# Patient Record
Sex: FEMALE | Race: ASIAN | NOT HISPANIC OR LATINO
[De-identification: names, ages, dates, MRNs, and addresses within clinical notes are randomized per-mention and may not be internally consistent; named-entity substitution may affect disease eponyms.]

---

## 2022-01-08 ENCOUNTER — APPOINTMENT (OUTPATIENT)
Dept: ANTEPARTUM | Facility: CLINIC | Age: 47
End: 2022-01-08
Payer: COMMERCIAL

## 2022-01-08 ENCOUNTER — APPOINTMENT (OUTPATIENT)
Dept: OBGYN | Facility: CLINIC | Age: 47
End: 2022-01-08
Payer: COMMERCIAL

## 2022-01-08 VITALS
HEIGHT: 63 IN | BODY MASS INDEX: 21.79 KG/M2 | WEIGHT: 123 LBS | SYSTOLIC BLOOD PRESSURE: 124 MMHG | DIASTOLIC BLOOD PRESSURE: 75 MMHG

## 2022-01-08 DIAGNOSIS — Z82.49 FAMILY HISTORY OF ISCHEMIC HEART DISEASE AND OTHER DISEASES OF THE CIRCULATORY SYSTEM: ICD-10-CM

## 2022-01-08 DIAGNOSIS — Z83.3 FAMILY HISTORY OF DIABETES MELLITUS: ICD-10-CM

## 2022-01-08 PROCEDURE — 76813 OB US NUCHAL MEAS 1 GEST: CPT

## 2022-01-08 PROCEDURE — 36415 COLL VENOUS BLD VENIPUNCTURE: CPT

## 2022-01-08 PROCEDURE — 0500F INITIAL PRENATAL CARE VISIT: CPT

## 2022-01-08 PROCEDURE — 76801 OB US < 14 WKS SINGLE FETUS: CPT

## 2022-01-08 RX ORDER — PNV/FERROUS SULFATE/FOLIC ACID 27-<0.5MG
TABLET ORAL
Refills: 0 | Status: ACTIVE | COMMUNITY

## 2022-01-10 ENCOUNTER — TRANSCRIPTION ENCOUNTER (OUTPATIENT)
Age: 47
End: 2022-01-10

## 2022-01-12 NOTE — PLAN
[FreeTextEntry1] : 46 year old P2 at 12w4d by LMP presenting with amenorrhea.\par -PAtient presents today as a transfer of care for high risk due to AMA\par -advised patient to start taking low dose aspirin 81mg daily\par -f/u antiphospholipid panel\par -f/u NT\par -prenatal labs from prevous office reviewed\par -f/u 4 weeks \par

## 2022-01-12 NOTE — PHYSICAL EXAM
[Chaperone Present] : A chaperone was present in the examining room during all aspects of the physical examination [Appropriately responsive] : appropriately responsive [Alert] : alert [No Acute Distress] : no acute distress [Soft] : soft [Non-tender] : non-tender [Non-distended] : non-distended [No HSM] : No HSM [No Lesions] : no lesions [No Mass] : no mass [Oriented x3] : oriented x3

## 2022-01-12 NOTE — HISTORY OF PRESENT ILLNESS
[Patient reported mammogram was normal] : Patient reported mammogram was normal [Y] : Positive pregnancy history [Mammogramdate] : 2020 [PapSmeardate] : 2021 [LMPDate] : 10/12/21 [PGxTotal] : 7 [[facility] : [facility] [[facility] : 2 [FreeTextEntry1] : TOP x1, MABx3, C/s x2

## 2022-01-15 ENCOUNTER — LABORATORY RESULT (OUTPATIENT)
Age: 47
End: 2022-01-15

## 2022-01-19 ENCOUNTER — NON-APPOINTMENT (OUTPATIENT)
Age: 47
End: 2022-01-19

## 2022-01-19 LAB
1ST TRIMESTER DATA: NORMAL
ADDENDUM DOC: NORMAL
AFP PNL SERPL: NORMAL
AFP SERPL-ACNC: NORMAL
CLINICAL BIOCHEMIST REVIEW: ABNORMAL
FREE BETA HCG 1ST TRIMESTER: NORMAL
Lab: NORMAL
NASAL BONE: PRESENT
NOTES NTD: NORMAL
NT: NORMAL
PAPP-A SERPL-ACNC: NORMAL
TRISOMY 18/3: NORMAL

## 2022-02-02 ENCOUNTER — APPOINTMENT (OUTPATIENT)
Dept: OBGYN | Facility: CLINIC | Age: 47
End: 2022-02-02
Payer: COMMERCIAL

## 2022-02-02 VITALS
SYSTOLIC BLOOD PRESSURE: 136 MMHG | BODY MASS INDEX: 22.86 KG/M2 | HEIGHT: 63 IN | DIASTOLIC BLOOD PRESSURE: 77 MMHG | WEIGHT: 129 LBS

## 2022-02-02 VITALS — SYSTOLIC BLOOD PRESSURE: 126 MMHG | DIASTOLIC BLOOD PRESSURE: 82 MMHG

## 2022-02-02 DIAGNOSIS — O09.529 SUPERVISION OF ELDERLY MULTIGRAVIDA, UNSPECIFIED TRIMESTER: ICD-10-CM

## 2022-02-02 DIAGNOSIS — Z87.42 PERSONAL HISTORY OF OTHER DISEASES OF THE FEMALE GENITAL TRACT: ICD-10-CM

## 2022-02-02 DIAGNOSIS — O28.9 UNSPECIFIED ABNORMAL FINDINGS ON ANTENATAL SCREENING OF MOTHER: ICD-10-CM

## 2022-02-02 DIAGNOSIS — Z3A.12 12 WEEKS GESTATION OF PREGNANCY: ICD-10-CM

## 2022-02-02 DIAGNOSIS — Z3A.16 16 WEEKS GESTATION OF PREGNANCY: ICD-10-CM

## 2022-02-02 PROCEDURE — 99213 OFFICE O/P EST LOW 20 MIN: CPT

## 2022-02-09 LAB
2ND TRIMESTER DATA: NORMAL
AFP PNL SERPL: NORMAL
AFP SERPL-ACNC: NORMAL
CLINICAL BIOCHEMIST REVIEW: NORMAL
NOTES NTD: NORMAL

## 2022-03-02 ENCOUNTER — APPOINTMENT (OUTPATIENT)
Dept: OBGYN | Facility: CLINIC | Age: 47
End: 2022-03-02
Payer: COMMERCIAL

## 2022-03-02 ENCOUNTER — APPOINTMENT (OUTPATIENT)
Dept: ANTEPARTUM | Facility: CLINIC | Age: 47
End: 2022-03-02

## 2022-03-02 VITALS
BODY MASS INDEX: 23.74 KG/M2 | DIASTOLIC BLOOD PRESSURE: 83 MMHG | HEIGHT: 63 IN | SYSTOLIC BLOOD PRESSURE: 146 MMHG | WEIGHT: 134 LBS

## 2022-03-02 PROCEDURE — 0502F SUBSEQUENT PRENATAL CARE: CPT

## 2022-03-17 RX ORDER — DOCUSATE SODIUM 100 MG/1
100 CAPSULE ORAL
Qty: 90 | Refills: 0 | Status: ACTIVE | COMMUNITY
Start: 2022-03-17 | End: 1900-01-01

## 2022-03-30 ENCOUNTER — APPOINTMENT (OUTPATIENT)
Dept: OBGYN | Facility: CLINIC | Age: 47
End: 2022-03-30
Payer: COMMERCIAL

## 2022-03-30 VITALS
WEIGHT: 139 LBS | SYSTOLIC BLOOD PRESSURE: 113 MMHG | DIASTOLIC BLOOD PRESSURE: 68 MMHG | BODY MASS INDEX: 24.63 KG/M2 | HEIGHT: 63 IN

## 2022-03-30 DIAGNOSIS — Z3A.24 24 WEEKS GESTATION OF PREGNANCY: ICD-10-CM

## 2022-03-30 PROCEDURE — 0502F SUBSEQUENT PRENATAL CARE: CPT

## 2022-03-30 PROCEDURE — 36415 COLL VENOUS BLD VENIPUNCTURE: CPT

## 2022-03-31 DIAGNOSIS — Z00.00 ENCOUNTER FOR GENERAL ADULT MEDICAL EXAMINATION W/OUT ABNORMAL FINDINGS: ICD-10-CM

## 2022-03-31 LAB
BASOPHILS # BLD AUTO: 0.01 K/UL
BASOPHILS NFR BLD AUTO: 0.2 %
EOSINOPHIL # BLD AUTO: 0.02 K/UL
EOSINOPHIL NFR BLD AUTO: 0.4 %
HCT VFR BLD CALC: 34.9 %
HGB BLD-MCNC: 11.2 G/DL
IMM GRANULOCYTES NFR BLD AUTO: 2.4 %
LYMPHOCYTES # BLD AUTO: 0.81 K/UL
LYMPHOCYTES NFR BLD AUTO: 15.1 %
MAN DIFF?: NORMAL
MCHC RBC-ENTMCNC: 31.5 PG
MCHC RBC-ENTMCNC: 32.1 GM/DL
MCV RBC AUTO: 98.3 FL
MONOCYTES # BLD AUTO: 0.64 K/UL
MONOCYTES NFR BLD AUTO: 11.9 %
NEUTROPHILS # BLD AUTO: 3.76 K/UL
NEUTROPHILS NFR BLD AUTO: 70 %
PLATELET # BLD AUTO: 184 K/UL
RBC # BLD: 3.55 M/UL
RBC # FLD: 13.6 %
WBC # FLD AUTO: 5.37 K/UL

## 2022-04-01 LAB — GLUCOSE 1H P 50 G GLC PO SERPL-MCNC: 160 MG/DL

## 2022-04-03 LAB
GLUCOSE 1H P 100 G GLC PO SERPL-MCNC: 207 MG/DL
GLUCOSE 2H P CHAL SERPL-MCNC: 192 MG/DL
GLUCOSE 3H P CHAL SERPL-MCNC: 160 MG/DL
GLUCOSE BS SERPL-MCNC: 83 MG/DL

## 2022-04-04 DIAGNOSIS — O24.419 GESTATIONAL DIABETES MELLITUS IN PREGNANCY, UNSPECIFIED CONTROL: ICD-10-CM

## 2022-04-04 RX ORDER — ISOPROPYL ALCOHOL 0.7 ML/ML
SWAB TOPICAL
Qty: 2 | Refills: 2 | Status: ACTIVE | COMMUNITY
Start: 2022-04-04 | End: 1900-01-01

## 2022-04-04 RX ORDER — BLOOD-GLUCOSE METER
W/DEVICE EACH MISCELLANEOUS
Qty: 1 | Refills: 0 | Status: ACTIVE | COMMUNITY
Start: 2022-04-04 | End: 1900-01-01

## 2022-04-04 RX ORDER — LANCETS
EACH MISCELLANEOUS
Qty: 2 | Refills: 2 | Status: ACTIVE | COMMUNITY
Start: 2022-04-04 | End: 1900-01-01

## 2022-04-04 RX ORDER — BLOOD SUGAR DIAGNOSTIC
STRIP MISCELLANEOUS 4 TIMES DAILY
Qty: 2 | Refills: 2 | Status: ACTIVE | COMMUNITY
Start: 2022-04-04 | End: 1900-01-01

## 2022-04-05 ENCOUNTER — APPOINTMENT (OUTPATIENT)
Dept: OBGYN | Facility: CLINIC | Age: 47
End: 2022-04-05
Payer: COMMERCIAL

## 2022-04-05 VITALS — SYSTOLIC BLOOD PRESSURE: 120 MMHG | WEIGHT: 139 LBS | BODY MASS INDEX: 24.62 KG/M2 | DIASTOLIC BLOOD PRESSURE: 77 MMHG

## 2022-04-05 PROCEDURE — 0502F SUBSEQUENT PRENATAL CARE: CPT

## 2022-04-20 ENCOUNTER — APPOINTMENT (OUTPATIENT)
Dept: ANTEPARTUM | Facility: CLINIC | Age: 47
End: 2022-04-20
Payer: COMMERCIAL

## 2022-04-20 ENCOUNTER — APPOINTMENT (OUTPATIENT)
Dept: OBGYN | Facility: CLINIC | Age: 47
End: 2022-04-20
Payer: COMMERCIAL

## 2022-04-20 VITALS — DIASTOLIC BLOOD PRESSURE: 76 MMHG | WEIGHT: 143 LBS | SYSTOLIC BLOOD PRESSURE: 119 MMHG | BODY MASS INDEX: 25.33 KG/M2

## 2022-04-20 PROCEDURE — 76816 OB US FOLLOW-UP PER FETUS: CPT

## 2022-04-20 PROCEDURE — 0502F SUBSEQUENT PRENATAL CARE: CPT

## 2022-04-20 PROCEDURE — 76819 FETAL BIOPHYS PROFIL W/O NST: CPT

## 2022-05-04 ENCOUNTER — APPOINTMENT (OUTPATIENT)
Dept: OBGYN | Facility: CLINIC | Age: 47
End: 2022-05-04

## 2022-05-04 VITALS — DIASTOLIC BLOOD PRESSURE: 72 MMHG | BODY MASS INDEX: 25.15 KG/M2 | WEIGHT: 142 LBS | SYSTOLIC BLOOD PRESSURE: 108 MMHG

## 2022-05-18 ENCOUNTER — APPOINTMENT (OUTPATIENT)
Dept: OBGYN | Facility: CLINIC | Age: 47
End: 2022-05-18
Payer: COMMERCIAL

## 2022-05-18 ENCOUNTER — APPOINTMENT (OUTPATIENT)
Dept: ANTEPARTUM | Facility: CLINIC | Age: 47
End: 2022-05-18
Payer: COMMERCIAL

## 2022-05-18 VITALS — DIASTOLIC BLOOD PRESSURE: 76 MMHG | SYSTOLIC BLOOD PRESSURE: 134 MMHG | BODY MASS INDEX: 25.86 KG/M2 | WEIGHT: 146 LBS

## 2022-05-18 PROCEDURE — 0502F SUBSEQUENT PRENATAL CARE: CPT

## 2022-05-18 PROCEDURE — 76816 OB US FOLLOW-UP PER FETUS: CPT

## 2022-05-18 PROCEDURE — 76819 FETAL BIOPHYS PROFIL W/O NST: CPT

## 2022-06-06 ENCOUNTER — APPOINTMENT (OUTPATIENT)
Dept: OBGYN | Facility: CLINIC | Age: 47
End: 2022-06-06
Payer: COMMERCIAL

## 2022-06-06 VITALS
BODY MASS INDEX: 26.4 KG/M2 | HEIGHT: 63 IN | SYSTOLIC BLOOD PRESSURE: 128 MMHG | WEIGHT: 149 LBS | DIASTOLIC BLOOD PRESSURE: 80 MMHG

## 2022-06-06 PROCEDURE — 0502F SUBSEQUENT PRENATAL CARE: CPT

## 2022-06-15 ENCOUNTER — APPOINTMENT (OUTPATIENT)
Dept: OBGYN | Facility: CLINIC | Age: 47
End: 2022-06-15

## 2022-06-15 VITALS
DIASTOLIC BLOOD PRESSURE: 80 MMHG | SYSTOLIC BLOOD PRESSURE: 131 MMHG | WEIGHT: 149 LBS | BODY MASS INDEX: 26.4 KG/M2 | HEIGHT: 63 IN

## 2022-06-15 PROCEDURE — 0502F SUBSEQUENT PRENATAL CARE: CPT

## 2022-06-22 ENCOUNTER — APPOINTMENT (OUTPATIENT)
Dept: OBGYN | Facility: CLINIC | Age: 47
End: 2022-06-22

## 2022-06-22 ENCOUNTER — NON-APPOINTMENT (OUTPATIENT)
Age: 47
End: 2022-06-22

## 2022-06-22 VITALS — DIASTOLIC BLOOD PRESSURE: 89 MMHG | SYSTOLIC BLOOD PRESSURE: 148 MMHG | BODY MASS INDEX: 26.57 KG/M2 | WEIGHT: 150 LBS

## 2022-06-22 VITALS — DIASTOLIC BLOOD PRESSURE: 89 MMHG | SYSTOLIC BLOOD PRESSURE: 147 MMHG

## 2022-06-22 DIAGNOSIS — Z33.1 PREGNANT STATE, INCIDENTAL: ICD-10-CM

## 2022-06-22 PROCEDURE — 0502F SUBSEQUENT PRENATAL CARE: CPT

## 2022-06-23 ENCOUNTER — TRANSCRIPTION ENCOUNTER (OUTPATIENT)
Age: 47
End: 2022-06-23

## 2022-06-23 LAB — HIV1+2 AB SPEC QL IA.RAPID: NONREACTIVE

## 2022-06-24 ENCOUNTER — INPATIENT (INPATIENT)
Facility: HOSPITAL | Age: 47
LOS: 3 days | Discharge: HOME | End: 2022-06-28
Attending: OBSTETRICS & GYNECOLOGY | Admitting: OBSTETRICS & GYNECOLOGY

## 2022-06-24 ENCOUNTER — NON-APPOINTMENT (OUTPATIENT)
Age: 47
End: 2022-06-24

## 2022-06-24 ENCOUNTER — TRANSCRIPTION ENCOUNTER (OUTPATIENT)
Age: 47
End: 2022-06-24

## 2022-06-24 VITALS
RESPIRATION RATE: 16 BRPM | TEMPERATURE: 99 F | HEART RATE: 79 BPM | DIASTOLIC BLOOD PRESSURE: 78 MMHG | SYSTOLIC BLOOD PRESSURE: 142 MMHG

## 2022-06-24 DIAGNOSIS — Z78.9 OTHER SPECIFIED HEALTH STATUS: ICD-10-CM

## 2022-06-24 DIAGNOSIS — Z3A.00 WEEKS OF GESTATION OF PREGNANCY NOT SPECIFIED: ICD-10-CM

## 2022-06-24 DIAGNOSIS — Z33.2 ENCOUNTER FOR ELECTIVE TERMINATION OF PREGNANCY: Chronic | ICD-10-CM

## 2022-06-24 DIAGNOSIS — Z98.890 OTHER SPECIFIED POSTPROCEDURAL STATES: Chronic | ICD-10-CM

## 2022-06-24 DIAGNOSIS — Z98.891 HISTORY OF UTERINE SCAR FROM PREVIOUS SURGERY: Chronic | ICD-10-CM

## 2022-06-24 DIAGNOSIS — O26.899 OTHER SPECIFIED PREGNANCY RELATED CONDITIONS, UNSPECIFIED TRIMESTER: ICD-10-CM

## 2022-06-24 LAB
ALBUMIN SERPL ELPH-MCNC: 3.5 G/DL — SIGNIFICANT CHANGE UP (ref 3.3–5)
ALP SERPL-CCNC: 117 U/L — SIGNIFICANT CHANGE UP (ref 40–120)
ALT FLD-CCNC: 13 U/L — SIGNIFICANT CHANGE UP (ref 4–33)
ANION GAP SERPL CALC-SCNC: 12 MMOL/L — SIGNIFICANT CHANGE UP (ref 7–14)
APTT BLD: 26.5 SEC — LOW (ref 27–36.3)
AST SERPL-CCNC: 20 U/L — SIGNIFICANT CHANGE UP (ref 4–32)
BASOPHILS # BLD AUTO: 0.02 K/UL — SIGNIFICANT CHANGE UP (ref 0–0.2)
BASOPHILS NFR BLD AUTO: 0.3 % — SIGNIFICANT CHANGE UP (ref 0–2)
BILIRUB SERPL-MCNC: 0.3 MG/DL — SIGNIFICANT CHANGE UP (ref 0.2–1.2)
BLD GP AB SCN SERPL QL: NEGATIVE — SIGNIFICANT CHANGE UP
BUN SERPL-MCNC: 17 MG/DL — SIGNIFICANT CHANGE UP (ref 7–23)
CALCIUM SERPL-MCNC: 9.4 MG/DL — SIGNIFICANT CHANGE UP (ref 8.4–10.5)
CHLORIDE SERPL-SCNC: 105 MMOL/L — SIGNIFICANT CHANGE UP (ref 98–107)
CO2 SERPL-SCNC: 18 MMOL/L — LOW (ref 22–31)
COVID-19 SPIKE DOMAIN AB INTERP: POSITIVE
COVID-19 SPIKE DOMAIN ANTIBODY RESULT: >250 U/ML — HIGH
CREAT SERPL-MCNC: 0.62 MG/DL — SIGNIFICANT CHANGE UP (ref 0.5–1.3)
EGFR: 112 ML/MIN/1.73M2 — SIGNIFICANT CHANGE UP
EOSINOPHIL # BLD AUTO: 0.03 K/UL — SIGNIFICANT CHANGE UP (ref 0–0.5)
EOSINOPHIL NFR BLD AUTO: 0.5 % — SIGNIFICANT CHANGE UP (ref 0–6)
FIBRINOGEN PPP-MCNC: 583 MG/DL — HIGH (ref 330–520)
GLUCOSE BLDC GLUCOMTR-MCNC: 89 MG/DL — SIGNIFICANT CHANGE UP (ref 70–99)
GLUCOSE SERPL-MCNC: 97 MG/DL — SIGNIFICANT CHANGE UP (ref 70–99)
HCT VFR BLD CALC: 34.5 % — SIGNIFICANT CHANGE UP (ref 34.5–45)
HGB BLD-MCNC: 11.7 G/DL — SIGNIFICANT CHANGE UP (ref 11.5–15.5)
IANC: 4.19 K/UL — SIGNIFICANT CHANGE UP (ref 1.8–7.4)
IMM GRANULOCYTES NFR BLD AUTO: 0.8 % — SIGNIFICANT CHANGE UP (ref 0–1.5)
INR BLD: <0.9 RATIO — LOW (ref 0.88–1.16)
LDH SERPL L TO P-CCNC: 224 U/L — SIGNIFICANT CHANGE UP (ref 135–225)
LYMPHOCYTES # BLD AUTO: 1.21 K/UL — SIGNIFICANT CHANGE UP (ref 1–3.3)
LYMPHOCYTES # BLD AUTO: 20 % — SIGNIFICANT CHANGE UP (ref 13–44)
MCHC RBC-ENTMCNC: 29.8 PG — SIGNIFICANT CHANGE UP (ref 27–34)
MCHC RBC-ENTMCNC: 33.9 GM/DL — SIGNIFICANT CHANGE UP (ref 32–36)
MCV RBC AUTO: 87.8 FL — SIGNIFICANT CHANGE UP (ref 80–100)
MONOCYTES # BLD AUTO: 0.54 K/UL — SIGNIFICANT CHANGE UP (ref 0–0.9)
MONOCYTES NFR BLD AUTO: 8.9 % — SIGNIFICANT CHANGE UP (ref 2–14)
NEUTROPHILS # BLD AUTO: 4.19 K/UL — SIGNIFICANT CHANGE UP (ref 1.8–7.4)
NEUTROPHILS NFR BLD AUTO: 69.5 % — SIGNIFICANT CHANGE UP (ref 43–77)
NRBC # BLD: 0 /100 WBCS — SIGNIFICANT CHANGE UP
NRBC # FLD: 0 K/UL — SIGNIFICANT CHANGE UP
PLATELET # BLD AUTO: 182 K/UL — SIGNIFICANT CHANGE UP (ref 150–400)
POTASSIUM SERPL-MCNC: 4.4 MMOL/L — SIGNIFICANT CHANGE UP (ref 3.5–5.3)
POTASSIUM SERPL-SCNC: 4.4 MMOL/L — SIGNIFICANT CHANGE UP (ref 3.5–5.3)
PROT SERPL-MCNC: 5.9 G/DL — LOW (ref 6–8.3)
PROTHROM AB SERPL-ACNC: 10 SEC — LOW (ref 10.5–13.4)
RBC # BLD: 3.93 M/UL — SIGNIFICANT CHANGE UP (ref 3.8–5.2)
RBC # FLD: 14.4 % — SIGNIFICANT CHANGE UP (ref 10.3–14.5)
RH IG SCN BLD-IMP: POSITIVE — SIGNIFICANT CHANGE UP
SARS-COV-2 IGG+IGM SERPL QL IA: >250 U/ML — HIGH
SARS-COV-2 IGG+IGM SERPL QL IA: POSITIVE
SARS-COV-2 RNA SPEC QL NAA+PROBE: SIGNIFICANT CHANGE UP
SODIUM SERPL-SCNC: 135 MMOL/L — SIGNIFICANT CHANGE UP (ref 135–145)
T PALLIDUM AB TITR SER: NEGATIVE — SIGNIFICANT CHANGE UP
URATE SERPL-MCNC: 5.7 MG/DL — SIGNIFICANT CHANGE UP (ref 2.5–7)
WBC # BLD: 6.04 K/UL — SIGNIFICANT CHANGE UP (ref 3.8–10.5)
WBC # FLD AUTO: 6.04 K/UL — SIGNIFICANT CHANGE UP (ref 3.8–10.5)

## 2022-06-24 PROCEDURE — 59400 OBSTETRICAL CARE: CPT | Mod: U7

## 2022-06-24 RX ORDER — MAGNESIUM HYDROXIDE 400 MG/1
30 TABLET, CHEWABLE ORAL
Refills: 0 | Status: DISCONTINUED | OUTPATIENT
Start: 2022-06-25 | End: 2022-06-28

## 2022-06-24 RX ORDER — FAMOTIDINE 10 MG/ML
20 INJECTION INTRAVENOUS ONCE
Refills: 0 | Status: COMPLETED | OUTPATIENT
Start: 2022-06-24 | End: 2022-06-24

## 2022-06-24 RX ORDER — INFLUENZA VIRUS VACCINE 15; 15; 15; 15 UG/.5ML; UG/.5ML; UG/.5ML; UG/.5ML
0.5 SUSPENSION INTRAMUSCULAR ONCE
Refills: 0 | Status: DISCONTINUED | OUTPATIENT
Start: 2022-06-24 | End: 2022-06-28

## 2022-06-24 RX ORDER — OXYTOCIN 10 UNIT/ML
333.33 VIAL (ML) INJECTION
Qty: 20 | Refills: 0 | Status: DISCONTINUED | OUTPATIENT
Start: 2022-06-24 | End: 2022-06-25

## 2022-06-24 RX ORDER — SODIUM CHLORIDE 9 MG/ML
1000 INJECTION, SOLUTION INTRAVENOUS
Refills: 0 | Status: DISCONTINUED | OUTPATIENT
Start: 2022-06-24 | End: 2022-06-25

## 2022-06-24 RX ORDER — CITRIC ACID/SODIUM CITRATE 300-500 MG
30 SOLUTION, ORAL ORAL ONCE
Refills: 0 | Status: COMPLETED | OUTPATIENT
Start: 2022-06-24 | End: 2022-06-24

## 2022-06-24 RX ORDER — DEXAMETHASONE 0.5 MG/5ML
4 ELIXIR ORAL EVERY 6 HOURS
Refills: 0 | Status: DISCONTINUED | OUTPATIENT
Start: 2022-06-24 | End: 2022-06-25

## 2022-06-24 RX ORDER — SODIUM CHLORIDE 9 MG/ML
1000 INJECTION, SOLUTION INTRAVENOUS ONCE
Refills: 0 | Status: DISCONTINUED | OUTPATIENT
Start: 2022-06-24 | End: 2022-06-24

## 2022-06-24 RX ORDER — OXYCODONE HYDROCHLORIDE 5 MG/1
10 TABLET ORAL
Refills: 0 | Status: DISCONTINUED | OUTPATIENT
Start: 2022-06-24 | End: 2022-06-24

## 2022-06-24 RX ORDER — OXYCODONE HYDROCHLORIDE 5 MG/1
5 TABLET ORAL
Refills: 0 | Status: DISCONTINUED | OUTPATIENT
Start: 2022-06-24 | End: 2022-06-28

## 2022-06-24 RX ORDER — SIMETHICONE 80 MG/1
80 TABLET, CHEWABLE ORAL EVERY 4 HOURS
Refills: 0 | Status: DISCONTINUED | OUTPATIENT
Start: 2022-06-24 | End: 2022-06-28

## 2022-06-24 RX ORDER — TETANUS TOXOID, REDUCED DIPHTHERIA TOXOID AND ACELLULAR PERTUSSIS VACCINE, ADSORBED 5; 2.5; 8; 8; 2.5 [IU]/.5ML; [IU]/.5ML; UG/.5ML; UG/.5ML; UG/.5ML
0.5 SUSPENSION INTRAMUSCULAR ONCE
Refills: 0 | Status: DISCONTINUED | OUTPATIENT
Start: 2022-06-24 | End: 2022-06-28

## 2022-06-24 RX ORDER — SODIUM CHLORIDE 9 MG/ML
1000 INJECTION, SOLUTION INTRAVENOUS ONCE
Refills: 0 | Status: DISCONTINUED | OUTPATIENT
Start: 2022-06-24 | End: 2022-06-28

## 2022-06-24 RX ORDER — ACETAMINOPHEN 500 MG
975 TABLET ORAL
Refills: 0 | Status: DISCONTINUED | OUTPATIENT
Start: 2022-06-24 | End: 2022-06-28

## 2022-06-24 RX ORDER — ONDANSETRON 8 MG/1
4 TABLET, FILM COATED ORAL EVERY 6 HOURS
Refills: 0 | Status: DISCONTINUED | OUTPATIENT
Start: 2022-06-24 | End: 2022-06-25

## 2022-06-24 RX ORDER — IBUPROFEN 200 MG
600 TABLET ORAL EVERY 6 HOURS
Refills: 0 | Status: COMPLETED | OUTPATIENT
Start: 2022-06-24 | End: 2023-05-23

## 2022-06-24 RX ORDER — BUTORPHANOL TARTRATE 2 MG/ML
0.12 INJECTION, SOLUTION INTRAMUSCULAR; INTRAVENOUS EVERY 6 HOURS
Refills: 0 | Status: DISCONTINUED | OUTPATIENT
Start: 2022-06-24 | End: 2022-06-24

## 2022-06-24 RX ORDER — HEPARIN SODIUM 5000 [USP'U]/ML
5000 INJECTION INTRAVENOUS; SUBCUTANEOUS EVERY 12 HOURS
Refills: 0 | Status: DISCONTINUED | OUTPATIENT
Start: 2022-06-24 | End: 2022-06-28

## 2022-06-24 RX ORDER — DIPHENHYDRAMINE HCL 50 MG
25 CAPSULE ORAL EVERY 6 HOURS
Refills: 0 | Status: DISCONTINUED | OUTPATIENT
Start: 2022-06-24 | End: 2022-06-28

## 2022-06-24 RX ORDER — ACETAMINOPHEN 500 MG
1000 TABLET ORAL ONCE
Refills: 0 | Status: DISCONTINUED | OUTPATIENT
Start: 2022-06-24 | End: 2022-06-28

## 2022-06-24 RX ORDER — KETOROLAC TROMETHAMINE 30 MG/ML
30 SYRINGE (ML) INJECTION EVERY 6 HOURS
Refills: 0 | Status: DISCONTINUED | OUTPATIENT
Start: 2022-06-24 | End: 2022-06-25

## 2022-06-24 RX ORDER — OXYCODONE HYDROCHLORIDE 5 MG/1
5 TABLET ORAL
Refills: 0 | Status: DISCONTINUED | OUTPATIENT
Start: 2022-06-24 | End: 2022-06-24

## 2022-06-24 RX ORDER — SODIUM CHLORIDE 9 MG/ML
1000 INJECTION, SOLUTION INTRAVENOUS
Refills: 0 | Status: DISCONTINUED | OUTPATIENT
Start: 2022-06-24 | End: 2022-06-28

## 2022-06-24 RX ORDER — SODIUM CHLORIDE 9 MG/ML
1000 INJECTION, SOLUTION INTRAVENOUS
Refills: 0 | Status: DISCONTINUED | OUTPATIENT
Start: 2022-06-24 | End: 2022-06-24

## 2022-06-24 RX ADMIN — Medication 30 MILLILITER(S): at 17:00

## 2022-06-24 RX ADMIN — FAMOTIDINE 20 MILLIGRAM(S): 10 INJECTION INTRAVENOUS at 17:00

## 2022-06-24 NOTE — OB RN TRIAGE NOTE - NS_OBGYNHISTORY_OBGYN_ALL_OB_FT
c/s 2006, 2009 2006 primary csection failed induction 7-4  2009 repeat 9-7  sab x4 with D&C x3  TOP x1

## 2022-06-24 NOTE — DISCHARGE NOTE OB - CARE PROVIDER_API CALL
Francis Ball)  MaternalFetal Medicine; Obstetrics and Gynecology  [address] [place] [id number]  Phone: [phone]  Fax: [phone]  Follow Up Time:

## 2022-06-24 NOTE — OB RN INTRAOPERATIVE NOTE - NSSELHIDDEN_OBGYN_ALL_OB_FT
[NS_DeliveryAttending1_OBGYN_ALL_OB_FT:[id number]=],[NS_DeliveryRN_OBGYN_ALL_OB_FT:[id number]=],[NS_CirculateRN2_OBGYN_ALL_OB_FT:[id number]=]

## 2022-06-24 NOTE — OB RN DELIVERY SUMMARY - NS_SEPSISRSKCALC_OBGYN_ALL_OB_FT
EOS calculated successfully. EOS Risk Factor: 0.5/1000 live births ([facility] national incidence); GA=36w4d; Temp=98.6; ROM=0.017; GBS='Unknown'; Antibiotics='No antibiotics or any antibiotics < 2 hrs prior to birth'

## 2022-06-24 NOTE — OB RN PATIENT PROFILE - FALL HARM RISK - UNIVERSAL INTERVENTIONS
Bed in lowest position, wheels locked, appropriate side rails in place/Call bell, personal items and telephone in reach/Instruct patient to call for assistance before getting out of bed or chair/[place] to call system/Physically safe environment - no spills, clutter or unnecessary equipment/Purposeful Proactive Rounding/Room/bathroom lighting operational, light cord in reach

## 2022-06-24 NOTE — OB PROVIDER DELIVERY SUMMARY - NSPROVIDERDELIVERYNOTE_OBGYN_ALL_OB_FT
Repeat LTCS, uncomplicated  Viable Male infant, Vertex presentation, cord gasses sent  Grossly normal fallopian tubes, uterus, and ovaries    Antibiotics: Ancef, Azithro  Surgical powder over hysterotomy    QBL: 176   IVF:  1300  UOP: 50    Edmundo PGY3  w/ Dr. Ball, Dr. Chahal

## 2022-06-24 NOTE — OB PROVIDER H&P - ASSESSMENT
46 y.o.  at 36.4w presenting in labor     Routine Admission Labs  Pre-Operative Labs  HELLP Labs  COVID 19 Swab Collected  - partner vaccinated   GBS Unknown, patient reports testing 2 days prior to admission   Fetal Resuscitation   NPO at ; may not wait until NPO status depending on FHT     Filiberto mcpherson/ Edmundo Galloway PGY 4, Charge RN  [facility]

## 2022-06-24 NOTE — OB PROVIDER TRIAGE NOTE - NSICDXPASTSURGICALHX_GEN_ALL_CORE_FT
PAST SURGICAL HISTORY:  History of dilation and curettage x3    Previous  section x2    Termination of pregnancy (fetus)

## 2022-06-24 NOTE — OB RN TRIAGE NOTE - FALL HARM RISK - UNIVERSAL INTERVENTIONS
Bed in lowest position, wheels locked, appropriate side rails in place/Call bell, personal items and telephone in reach/Instruct patient to call for assistance before getting out of bed or chair/[place] to call system/Physically safe environment - no spills, clutter or unnecessary equipment/Purposeful Proactive Rounding/Room/bathroom lighting operational, light cord in reach

## 2022-06-24 NOTE — OB PROVIDER H&P - NS_OBGYNHISTORY_OBGYN_ALL_OB_FT
2006 primary  failed induction 7-4  2009 repeat 9-7  sab x4 with D&C x3  TOP x1 2006 primary  failed induction 7-4  2009 repeat 9-7  sab x2  incomplete with D&C x2  TOP x1

## 2022-06-24 NOTE — OB RN DELIVERY SUMMARY - NSSELHIDDEN_OBGYN_ALL_OB_FT
[NS_DeliveryAttending1_OBGYN_ALL_OB_FT:[id number]=],[NS_DeliveryRN_OBGYN_ALL_OB_FT:[id number]=],[NS_CirculateRN2_OBGYN_ALL_OB_FT:[id number]=]

## 2022-06-24 NOTE — OB PROVIDER DELIVERY SUMMARY - NSSELHIDDEN_OBGYN_ALL_OB_FT
[NS_DeliveryAttending1_OBGYN_ALL_OB_FT:[id number]=],[NS_DeliveryRN_OBGYN_ALL_OB_FT:[id number]=],[NS_CirculateRN2_OBGYN_ALL_OB_FT:[email or web address]=]

## 2022-06-24 NOTE — DISCHARGE NOTE OB - PATIENT PORTAL LINK FT
You can access the FollowMyHealth Patient Portal offered by [facility] by registering at the following website: [email or web address]. By joining [facility]’s FollowMyHealth portal, you will also be able to view your health information using other applications (apps) compatible with our system.

## 2022-06-24 NOTE — OB RN PATIENT PROFILE - WEIGHT: PREPREGNANCY IN LBS
OLIVE MOORE  : 1937 15:56:50  ACCOUNT:  [id number]  HOME PHONE:  [phone]  WORK PHONE:  [phone]  Pt wife called in f/u.  Pt better.    Plan:  resume torsemide 20mg daily and f/u as needed.  Debbie IQBAL     118

## 2022-06-24 NOTE — PROGRESS NOTE ADULT - SUBJECTIVE AND OBJECTIVE BOX
Patient is a 46y old  Female who presents with a chief complaint of contraction    HPI: Patient is 46 year old patient at 36 weeks 4 days gestation admitted with painful contraction every 2 minutes.  Prenatal care complicated by GDM and recent mildly elevated blood pressures.        PAST MEDICAL & SURGICAL HISTORY:  No pertinent past medical history      Previous  section  x2      History of dilation and curettage  x3      Termination of pregnancy (fetus)          MEDICATIONS  (STANDING):  lactated ringers Bolus 1000 milliLiter(s) IV Bolus once  lactated ringers. 1000 milliLiter(s) (125 mL/Hr) IV Continuous <Continuous>              Vital Signs Last 24 Hrs  T(C): 37.0 (2022 13:39), Max: 37 (2022 13:26)  T(F): 98.6 (2022 13:39), Max: 98.6 (2022 13:26)  HR: 80 (2022 14:45) (72 - 81)  BP: 150/79 (2022 14:45) (126/75 - 150/79)  BP(mean): --  RR: 16 (2022 13:26) (16 - 16)  SpO2: --    PHYSICAL EXAM:  Patient was noted to have contractions every 2-3 minutes with a non reactive tracing but moderate variability and no decelerations  cx was finger tip dilated.  The FHR tracing remains non reactive in the presence of painful uterine contractions  I have discussed the findings with her. As she has started having regular contractions and FHR Tracing remains non reactive we advised delivery by  section at [facility].  Patient understands the risks of early delivery      LABORATORY:                        11.7   6.04  )-----------( 182      ( 2022 14:24 )             34.5

## 2022-06-24 NOTE — DISCHARGE NOTE OB - MEDICATION SUMMARY - MEDICATIONS TO TAKE
I will START or STAY ON the medications listed below when I get home from the hospital:    ibuprofen 600 mg oral tablet  -- 1 tab(s) by mouth every 6 hours, As Needed  -- Indication: For Pain    acetaminophen 325 mg oral tablet  -- 3 tab(s) by mouth every 6 hours, As Needed  -- Indication: For Pain

## 2022-06-24 NOTE — OB RN TRIAGE NOTE - NSICDXPASTSURGICALHX_GEN_ALL_CORE_FT
PAST SURGICAL HISTORY:  Previous  section x2     PAST SURGICAL HISTORY:  History of dilation and curettage x3    Previous  section x2    Termination of pregnancy (fetus)

## 2022-06-24 NOTE — DISCHARGE NOTE OB - NS MD DC FALL RISK RISK
For information on Fall & Injury Prevention, visit: [email or web address] OR  [email or web address] OR  https://www.cdc.gov/steadi/patient.html

## 2022-06-24 NOTE — DISCHARGE NOTE OB - CARE PLAN
Principal Discharge DX:	Status post repeat low transverse  section  Assessment and plan of treatment:	Nothing per vagina.  No tub soaking or heavy lifting.   1

## 2022-06-24 NOTE — OB PROVIDER H&P - HISTORY OF PRESENT ILLNESS
46 y.o.  at 36.4w presenting with complaints of painful contractions 8/10 pain.  Patient reports positive fetal movement, denies LOF, denies vaginal bleeding, denies contractions.    a/p course complications GDMA 1  New onset elevated BP's   scheduled for tertiary c/s on 2022     pmh: denies  psh: denies   obhx: SAB x4    TOP x1  primary c/s  for arrest of dilation at 6cm 7lbs 4oz  repeat c /s 2009 9lbs 7oz  gynhx: denies     Meds: PNV, Baby ASA      46 y.o.  at 36.4w presenting with complaints of painful contractions 8/10 pain.  Patient reports positive fetal movement, denies LOF, denies vaginal bleeding, denies contractions.    a/p course complications GDMA 1  New onset elevated BP's   scheduled for tertiary c/s on 2022     pmh: denies  psh: denies   obhx: SAB x2  incomplete w/ d&c x2  TOP x1  primary c/s  for arrest of dilation at 6cm 7lbs 4oz  repeat c /s  9lbs 7oz  gynhx: denies     Meds: PNV, Baby ASA

## 2022-06-24 NOTE — OB PROVIDER H&P - NSPREVIOUSTERM_OBGYN_ALL_OB
Bed: 12  Expected date:   Expected time:   Means of arrival:   Comments:  DESHAWN López, RN  07/22/21 7424 Unknown

## 2022-06-24 NOTE — OB PROVIDER TRIAGE NOTE - NSHPPHYSICALEXAM_GEN_ALL_CORE
Vital Signs Last 24 Hrs  T(C): 37.0 (24 Jun 2022 13:39), Max: 37 (24 Jun 2022 13:26)  T(F): 98.6 (24 Jun 2022 13:39), Max: 98.6 (24 Jun 2022 13:26)  HR: 80 (24 Jun 2022 14:45) (72 - 81)  BP: 150/79 (24 Jun 2022 14:45) (126/75 - 150/79)  BP(mean): --  RR: 16 (24 Jun 2022 13:26) (16 - 16)  SpO2: --    VSS  Elevated BP's but no severe range BP's     Abdomen soft nontender gravid   bpm minimal to moderate variability  contractions q 1-3 minutes, moderate via palpation     Cephalic presentation via ultrasound    VE: 0.5/50/-3

## 2022-06-24 NOTE — OB PROVIDER TRIAGE NOTE - HISTORY OF PRESENT ILLNESS
46 y.o.  at 36.4w presenting with complaints of painful contractions 8/10 pain.  Patient reports positive fetal movement, denies LOF, denies vaginal bleeding, denies contractions.    a/p course complications GDMA 1  New onset elevated BP's   scheduled for tertiary c/s on 2022     pmh: denies  psh: denies   obhx: SAB x4    TOP x1  primary c/s  for arrest of dilation at 6cm 7lbs 4oz  repeat c /s 2009 9lbs 7oz  gynhx: denies     Meds: PNV, Baby ASA

## 2022-06-24 NOTE — DISCHARGE NOTE OB - NS AS DC FU INST LIST INST
[Respiratory Effort] : normal respiratory effort [Normal Heart Sounds] : normal heart sounds [2+] : left 2+ [Ankle Swelling (On Exam)] : present [Ankle Swelling On The Right] : mild [Alert] : alert [Oriented to Person] : oriented to person [Oriented to Place] : oriented to place [Oriented to Time] : oriented to time [Calm] : calm [Varicose Veins Of Lower Extremities] : not present [] : not present [de-identified] : well appearing no

## 2022-06-25 LAB
BASOPHILS # BLD AUTO: 0.02 K/UL — SIGNIFICANT CHANGE UP (ref 0–0.2)
BASOPHILS NFR BLD AUTO: 0.2 % — SIGNIFICANT CHANGE UP (ref 0–2)
EOSINOPHIL # BLD AUTO: 0.02 K/UL — SIGNIFICANT CHANGE UP (ref 0–0.5)
EOSINOPHIL NFR BLD AUTO: 0.2 % — SIGNIFICANT CHANGE UP (ref 0–6)
HCT VFR BLD CALC: 32.2 % — LOW (ref 34.5–45)
HGB BLD-MCNC: 10.5 G/DL — LOW (ref 11.5–15.5)
IANC: 6.48 K/UL — SIGNIFICANT CHANGE UP (ref 1.8–7.4)
IMM GRANULOCYTES NFR BLD AUTO: 0.5 % — SIGNIFICANT CHANGE UP (ref 0–1.5)
LYMPHOCYTES # BLD AUTO: 1.15 K/UL — SIGNIFICANT CHANGE UP (ref 1–3.3)
LYMPHOCYTES # BLD AUTO: 14.2 % — SIGNIFICANT CHANGE UP (ref 13–44)
MCHC RBC-ENTMCNC: 29.7 PG — SIGNIFICANT CHANGE UP (ref 27–34)
MCHC RBC-ENTMCNC: 32.6 GM/DL — SIGNIFICANT CHANGE UP (ref 32–36)
MCV RBC AUTO: 91 FL — SIGNIFICANT CHANGE UP (ref 80–100)
MONOCYTES # BLD AUTO: 0.37 K/UL — SIGNIFICANT CHANGE UP (ref 0–0.9)
MONOCYTES NFR BLD AUTO: 4.6 % — SIGNIFICANT CHANGE UP (ref 2–14)
NEUTROPHILS # BLD AUTO: 6.48 K/UL — SIGNIFICANT CHANGE UP (ref 1.8–7.4)
NEUTROPHILS NFR BLD AUTO: 80.3 % — HIGH (ref 43–77)
NRBC # BLD: 0 /100 WBCS — SIGNIFICANT CHANGE UP
NRBC # FLD: 0 K/UL — SIGNIFICANT CHANGE UP
PLATELET # BLD AUTO: 151 K/UL — SIGNIFICANT CHANGE UP (ref 150–400)
RBC # BLD: 3.54 M/UL — LOW (ref 3.8–5.2)
RBC # FLD: 14.3 % — SIGNIFICANT CHANGE UP (ref 10.3–14.5)
WBC # BLD: 8.08 K/UL — SIGNIFICANT CHANGE UP (ref 3.8–10.5)
WBC # FLD AUTO: 8.08 K/UL — SIGNIFICANT CHANGE UP (ref 3.8–10.5)

## 2022-06-25 RX ORDER — FERROUS SULFATE 325(65) MG
325 TABLET ORAL DAILY
Refills: 0 | Status: DISCONTINUED | OUTPATIENT
Start: 2022-06-25 | End: 2022-06-28

## 2022-06-25 RX ORDER — SENNA PLUS 8.6 MG/1
2 TABLET ORAL AT BEDTIME
Refills: 0 | Status: DISCONTINUED | OUTPATIENT
Start: 2022-06-25 | End: 2022-06-28

## 2022-06-25 RX ORDER — IBUPROFEN 200 MG
600 TABLET ORAL EVERY 6 HOURS
Refills: 0 | Status: DISCONTINUED | OUTPATIENT
Start: 2022-06-25 | End: 2022-06-28

## 2022-06-25 RX ADMIN — Medication 1 TABLET(S): at 23:16

## 2022-06-25 RX ADMIN — SENNA PLUS 2 TABLET(S): 8.6 TABLET ORAL at 21:07

## 2022-06-25 RX ADMIN — Medication 325 MILLIGRAM(S): at 23:16

## 2022-06-25 RX ADMIN — Medication 30 MILLIGRAM(S): at 14:54

## 2022-06-25 RX ADMIN — Medication 30 MILLIGRAM(S): at 05:25

## 2022-06-25 RX ADMIN — Medication 30 MILLIGRAM(S): at 07:00

## 2022-06-25 RX ADMIN — Medication 975 MILLIGRAM(S): at 20:41

## 2022-06-25 RX ADMIN — Medication 30 MILLIGRAM(S): at 14:24

## 2022-06-25 RX ADMIN — HEPARIN SODIUM 5000 UNIT(S): 5000 INJECTION INTRAVENOUS; SUBCUTANEOUS at 05:26

## 2022-06-25 RX ADMIN — HEPARIN SODIUM 5000 UNIT(S): 5000 INJECTION INTRAVENOUS; SUBCUTANEOUS at 18:01

## 2022-06-25 RX ADMIN — Medication 975 MILLIGRAM(S): at 20:11

## 2022-06-25 NOTE — PROGRESS NOTE ADULT - SUBJECTIVE AND OBJECTIVE BOX
POST OP DAY  1  s/p   SECTION    SUBJECTIVE:  I'm ok.    PAIN SCALE SCORE: [x] Refer to charted pain scores    THERAPY:  [  x] Spinal morphine   [  ] Epidural morphine   [  ] IV PCA Hydromorphone 1 mg/ml    OBJECTIVE:  Comfortable Appearing    SEDATION SCORE:	  [ x ] Alert	    [  ] Drowsy        [  ] Arousable	[  ] Asleep	[  ] Unresponsive    Side Effects:	  [ x ] None	     [  ] Nausea        [  ] Pruritus        [  ] Weakness   [  ] Numbness        ASSESSMENT/ PLAN   [   ] Discontinue         [  ] Continue    [ x ] Change to prn Analgesics as per primary service.    DOCUMENTATION & VERIFICATION OF CURRENT MEDS [ x ] Done    COMMENTS: No Headache.

## 2022-06-25 NOTE — PROGRESS NOTE ADULT - SUBJECTIVE AND OBJECTIVE BOX
OB Progress Note:  Delivery, POD#1    S: 45yo POD#1 s/p LTCS . Pain well controlled, tolerating regular diet, not yet passing flatus, ambulating without difficulty.  Denies heavy vaginal bleeding, N/V, CP/SOB/lightheadedness/dizziness.     O:   Vital Signs Last 24 Hrs  T(C): 37.3 (2022 02:30), Max: 37.3 (2022 02:30)  T(F): 99.1 (2022 02:30), Max: 99.1 (2022 02:30)  HR: 84 (2022 02:30) (67 - 84)  BP: 122/60 (2022 02:30) (122/60 - 168/72)  BP(mean): 82 (2022 21:00) (79 - 94)  RR: 17 (2022 02:30) (15 - 22)  SpO2: 97% (2022 02:30) (97% - 100%)    Labs:  Blood type: O Positive  Rubella IgG: RPR: Negative                          11.7   6.04 >-----------< 182    (  @ 14:24 )             34.5    22 @ 14:24      135  |  105  |  17  ----------------------------<  97  4.4   |  18<L>  |  0.62        Ca    9.4      2022 14:24    TPro  5.9<L>  /  Alb  3.5  /  TBili  0.3  /  DBili  x   /  AST  20  /  ALT  13  /  AlkPhos  117  22 @ 14:24          PE:  General: NAD  Abdomen: Mildly distended, appropriately tender, Fundus firm,   Incision: c/d/i  VE: No heavy vaginal bleeding       OB Progress Note:  Delivery, POD#1    S: 45yo POD#1 s/p LTCS . Pain well controlled, tolerating regular diet, not yet passing flatus, ambulating without difficulty. Voiding spontaneously.  Denies heavy vaginal bleeding, N/V, CP/SOB/lightheadedness/dizziness.     O:   Vital Signs Last 24 Hrs  T(C): 37.3 (2022 02:30), Max: 37.3 (2022 02:30)  T(F): 99.1 (2022 02:30), Max: 99.1 (2022 02:30)  HR: 84 (2022 02:30) (67 - 84)  BP: 122/60 (2022 02:30) (122/60 - 168/72)  BP(mean): 82 (2022 21:00) (79 - 94)  RR: 17 (2022 02:30) (15 - 22)  SpO2: 97% (2022 02:30) (97% - 100%)    Labs:  Blood type: O Positive  Rubella IgG: RPR: Negative                          11.7   6.04 >-----------< 182    (  @ 14:24 )             34.5    22 @ 14:24      135  |  105  |  17  ----------------------------<  97  4.4   |  18<L>  |  0.62        Ca    9.4      2022 14:24    TPro  5.9<L>  /  Alb  3.5  /  TBili  0.3  /  DBili  x   /  AST  20  /  ALT  13  /  AlkPhos  117  22 @ 14:24          PE:  General: NAD  Abdomen: Mildly distended, appropriately tender, Fundus firm,   Incision: c/d/i  VE: No heavy vaginal bleeding

## 2022-06-25 NOTE — PROGRESS NOTE ADULT - SUBJECTIVE AND OBJECTIVE BOX
Patient seen and examined at the bedside  Patient reports ambulating, tolerating regular diet, voiding, minimal cramping, minimal lochia  Patient denies fever/chills, HA, SOB, CP, N/V, dysuria    PE  ICU Vital Signs Last 24 Hrs  T(C): 36.9 (25 Jun 2022 10:00), Max: 37.3 (25 Jun 2022 02:30)  T(F): 98.4 (25 Jun 2022 10:00), Max: 99.1 (25 Jun 2022 02:30)  HR: 78 (25 Jun 2022 10:00) (67 - 84)  BP: 110/57 (25 Jun 2022 10:00) (110/57 - 168/72)  BP(mean): 82 (24 Jun 2022 21:00) (79 - 94)  RR: 18 (25 Jun 2022 10:00) (15 - 22)  SpO2: 98% (25 Jun 2022 10:00) (97% - 100%)    GEN: AAOx3  Affect: Patient smiling and engaged in conversation  Abdomen: soft, NT, Fundus at umbilicus, Incision C/D/I  LE: mild generalized edema, NT, no localized erythema/swelling noted    Labs:                        10.5   8.08  )-----------( 151      ( 25 Jun 2022 06:30 )             32.2       A/P  s/p repeat C/S POD#1  Routine post-partum care  Routine pain management with Tylenol/ Ibuprofen  PP Counseling: Please notify MD if you experience persistent and overwhelming emotions inhibiting daily activies of care of infant, persistent headache, Severe vaginal bleeding, foul smelling vaginal discharge, Urinary urgency/ frequency/ burning, or localized lower extremity swelling/redness/pain

## 2022-06-25 NOTE — LACTATION INITIAL EVALUATION - NS LACT CON REASON FOR REQ
reviewed  with  mother late    behavior, mother  states  nbn  latching  and  getting  formula  .  reviewed  milk  production  and stimulation  at breast/general questions without assessment/multiparous mom/early term/late  infant/staff request/patient request

## 2022-06-25 NOTE — LACTATION INITIAL EVALUATION - LACTATION INTERVENTIONS
initiate/review safe skin-to-skin/initiate/review hand expression/review techniques to increase milk supply/review techniques to manage sore nipples/engorgement/initiate/review alternate feeding method/reviewed importance of monitoring infant diapers, the breastfeeding log, and minimum output each day/reviewed risks of unnecessary formula supplementation/reviewed strategies to transition to breastfeeding only/recommended follow-up with pediatrician within 24 hours of discharge

## 2022-06-26 RX ORDER — ASPIRIN/CALCIUM CARB/MAGNESIUM 324 MG
0 TABLET ORAL
Qty: 0 | Refills: 0 | DISCHARGE

## 2022-06-26 RX ORDER — ACETAMINOPHEN 500 MG
3 TABLET ORAL
Qty: 0 | Refills: 0 | DISCHARGE
Start: 2022-06-26

## 2022-06-26 RX ORDER — IBUPROFEN 200 MG
1 TABLET ORAL
Qty: 0 | Refills: 0 | DISCHARGE
Start: 2022-06-26

## 2022-06-26 RX ADMIN — Medication 600 MILLIGRAM(S): at 00:55

## 2022-06-26 RX ADMIN — HEPARIN SODIUM 5000 UNIT(S): 5000 INJECTION INTRAVENOUS; SUBCUTANEOUS at 05:30

## 2022-06-26 RX ADMIN — MAGNESIUM HYDROXIDE 30 MILLILITER(S): 400 TABLET, CHEWABLE ORAL at 21:59

## 2022-06-26 RX ADMIN — Medication 600 MILLIGRAM(S): at 05:30

## 2022-06-26 RX ADMIN — Medication 600 MILLIGRAM(S): at 06:00

## 2022-06-26 RX ADMIN — HEPARIN SODIUM 5000 UNIT(S): 5000 INJECTION INTRAVENOUS; SUBCUTANEOUS at 18:26

## 2022-06-26 RX ADMIN — Medication 600 MILLIGRAM(S): at 18:26

## 2022-06-26 RX ADMIN — Medication 600 MILLIGRAM(S): at 19:10

## 2022-06-26 RX ADMIN — Medication 975 MILLIGRAM(S): at 22:18

## 2022-06-26 RX ADMIN — Medication 975 MILLIGRAM(S): at 21:58

## 2022-06-26 RX ADMIN — Medication 600 MILLIGRAM(S): at 00:25

## 2022-06-26 RX ADMIN — Medication 600 MILLIGRAM(S): at 12:45

## 2022-06-26 RX ADMIN — Medication 600 MILLIGRAM(S): at 12:17

## 2022-06-26 NOTE — PROGRESS NOTE ADULT - SUBJECTIVE AND OBJECTIVE BOX
OB Progress Note: pTLCS, POD#2    S: 47yo now  POD#2 s/p pLTCS. Pt seen and examine at bedside. No acute events overnight. Pain is well controlled. She is tolerating a regular diet and passing flatus. She is voiding spontaneously, and ambulating without difficulty. Denies CP/SOB. Denies lightheadedness/dizziness. Denies N/V.    O:  Vitals:  Vital Signs Last 24 Hrs  T(C): 36.8 (2022 17:57), Max: 37.1 (2022 06:45)  T(F): 98.2 (2022 17:57), Max: 98.8 (2022 06:45)  HR: 87 (2022 17:57) (76 - 87)  BP: 127/67 (2022 17:57) (105/57 - 127/67)  BP(mean): --  RR: 18 (2022 17:57) (18 - 18)  SpO2: 99% (2022 17:57) (98% - 99%)    MEDICATIONS  (STANDING):  acetaminophen     Tablet .. 975 milliGRAM(s) Oral <User Schedule>  acetaminophen   IVPB .. 1000 milliGRAM(s) IV Intermittent once  diphtheria/tetanus/pertussis (acellular) Vaccine (ADAcel) 0.5 milliLiter(s) IntraMuscular once  ferrous    sulfate 325 milliGRAM(s) Oral daily  heparin   Injectable 5000 Unit(s) SubCutaneous every 12 hours  ibuprofen  Tablet. 600 milliGRAM(s) Oral every 6 hours  influenza   Vaccine 0.5 milliLiter(s) IntraMuscular once  lactated ringers Bolus 1000 milliLiter(s) IV Bolus once  lactated ringers. 1000 milliLiter(s) (125 mL/Hr) IV Continuous <Continuous>  prenatal multivitamin 1 Tablet(s) Oral daily  senna 2 Tablet(s) Oral at bedtime      MEDICATIONS  (PRN):  diphenhydrAMINE 25 milliGRAM(s) Oral every 6 hours PRN Pruritus  magnesium hydroxide Suspension 30 milliLiter(s) Oral two times a day PRN Constipation  oxyCODONE    IR 5 milliGRAM(s) Oral every 3 hours PRN Moderate to Severe Pain (4-10)  simethicone 80 milliGRAM(s) Chew every 4 hours PRN Gas      Labs:  Blood type: O Positive  Rubella IgG: RPR: Negative                          10.5<L>   8.08 >-----------< 151    (  @ 06:30 )             32.2<L>                        11.7   6.04 >-----------< 182    (  @ 14:24 )             34.5    22 @ 14:24      135  |  105  |  17  ----------------------------<  97  4.4   |  18<L>  |  0.62        Ca    9.4      2022 14:24    TPro  5.9<L>  /  Alb  3.5  /  TBili  0.3  /  DBili  x   /  AST  20  /  ALT  13  /  AlkPhos  117  22 @ 14:24          PE:  General: NAD  Lungs: CTA b/l good airway entry  CVS: RRR S1S2  Abdomen: Soft, appropriately tender, incision c/d/i.  Extremities: No erythema, no pitting edema, pedal pulse 2+ bilateral      A/P: 47yo now  POD#2 s/p pLTCS.  Patient is stable and doing well post-operatively.    - Continue Routine Postop/ PP care  - Continue regular diet.  - Increase ambulation.  - Continue Motrin, Tylenol, oxycodone PRN for pain control.  - Reviewed PEC precaution in detail pt verbalized understanding  - f/u in 6wks for repeat GTT hx of GDMA 1  - Discharge planning today  - F/u in MD office 1-2 wks        ALFREDO Pratt OB Progress Note: pTLCS, POD#2    S: 47yo now  POD#2 s/p rLTCS QBL 176ml. Pt seen and examine at bedside. No acute events overnight. Pain is well controlled. She is tolerating a regular diet and passing flatus. She is voiding spontaneously, and ambulating without difficulty. Denies CP/SOB. Denies lightheadedness/dizziness. Denies N/V.    O:  Vitals:  Vital Signs Last 24 Hrs  T(C): 36.8 (2022 17:57), Max: 37.1 (2022 06:45)  T(F): 98.2 (2022 17:57), Max: 98.8 (2022 06:45)  HR: 87 (2022 17:57) (76 - 87)  BP: 127/67 (2022 17:57) (105/57 - 127/67)  BP(mean): --  RR: 18 (2022 17:57) (18 - 18)  SpO2: 99% (2022 17:57) (98% - 99%)    MEDICATIONS  (STANDING):  acetaminophen     Tablet .. 975 milliGRAM(s) Oral <User Schedule>  acetaminophen   IVPB .. 1000 milliGRAM(s) IV Intermittent once  diphtheria/tetanus/pertussis (acellular) Vaccine (ADAcel) 0.5 milliLiter(s) IntraMuscular once  ferrous    sulfate 325 milliGRAM(s) Oral daily  heparin   Injectable 5000 Unit(s) SubCutaneous every 12 hours  ibuprofen  Tablet. 600 milliGRAM(s) Oral every 6 hours  influenza   Vaccine 0.5 milliLiter(s) IntraMuscular once  lactated ringers Bolus 1000 milliLiter(s) IV Bolus once  lactated ringers. 1000 milliLiter(s) (125 mL/Hr) IV Continuous <Continuous>  prenatal multivitamin 1 Tablet(s) Oral daily  senna 2 Tablet(s) Oral at bedtime      MEDICATIONS  (PRN):  diphenhydrAMINE 25 milliGRAM(s) Oral every 6 hours PRN Pruritus  magnesium hydroxide Suspension 30 milliLiter(s) Oral two times a day PRN Constipation  oxyCODONE    IR 5 milliGRAM(s) Oral every 3 hours PRN Moderate to Severe Pain (4-10)  simethicone 80 milliGRAM(s) Chew every 4 hours PRN Gas      Labs:  Blood type: O Positive  Rubella IgG: RPR: Negative                          10.5<L>   8.08 >-----------< 151    (  @ 06:30 )             32.2<L>                        11.7   6.04 >-----------< 182    (  @ 14:24 )             34.5    22 @ 14:24      135  |  105  |  17  ----------------------------<  97  4.4   |  18<L>  |  0.62        Ca    9.4      2022 14:24    TPro  5.9<L>  /  Alb  3.5  /  TBili  0.3  /  DBili  x   /  AST  20  /  ALT  13  /  AlkPhos  117  22 @ 14:24          PE:  General: NAD  Lungs: CTA b/l good airway entry  CVS: RRR S1S2  Abdomen: Soft, appropriately tender, incision c/d/i.  Extremities: No erythema, no pitting edema, pedal pulse 2+ bilateral      A/P: 47yo now  POD#2 s/p rLTCS QBL 176ml.  Patient is stable and doing well post-operatively.    - Continue Routine Postop/ PP care  - Continue regular diet.  - Increase ambulation.  - Continue Motrin, Tylenol, oxycodone PRN for pain control.  - Reviewed PEC precaution in detail pt verbalized understanding  - f/u in 6wks for repeat GTT hx of GDMA 1  - Discharge planning today  - F/u in MD office 1-2 wks        VIPUL Pratt-BC OB Progress Note: pTLCS, POD#2    S: 45yo now  POD#2 s/p rLTCS QBL 176ml. Pt seen and examine at bedside. No acute events overnight. Pain is well controlled. She is tolerating a regular diet and passing flatus. She is voiding spontaneously, and ambulating without difficulty. Denies CP/SOB. Denies lightheadedness/dizziness. Denies N/V.    O:  Vitals:  Vital Signs Last 24 Hrs  T(C): 36.8 (2022 17:57), Max: 37.1 (2022 06:45)  T(F): 98.2 (2022 17:57), Max: 98.8 (2022 06:45)  HR: 87 (2022 17:57) (76 - 87)  BP: 127/67 (2022 17:57) (105/57 - 127/67)  BP(mean): --  RR: 18 (2022 17:57) (18 - 18)  SpO2: 99% (2022 17:57) (98% - 99%)    MEDICATIONS  (STANDING):  acetaminophen     Tablet .. 975 milliGRAM(s) Oral <User Schedule>  acetaminophen   IVPB .. 1000 milliGRAM(s) IV Intermittent once  diphtheria/tetanus/pertussis (acellular) Vaccine (ADAcel) 0.5 milliLiter(s) IntraMuscular once  ferrous    sulfate 325 milliGRAM(s) Oral daily  heparin   Injectable 5000 Unit(s) SubCutaneous every 12 hours  ibuprofen  Tablet. 600 milliGRAM(s) Oral every 6 hours  influenza   Vaccine 0.5 milliLiter(s) IntraMuscular once  lactated ringers Bolus 1000 milliLiter(s) IV Bolus once  lactated ringers. 1000 milliLiter(s) (125 mL/Hr) IV Continuous <Continuous>  prenatal multivitamin 1 Tablet(s) Oral daily  senna 2 Tablet(s) Oral at bedtime      MEDICATIONS  (PRN):  diphenhydrAMINE 25 milliGRAM(s) Oral every 6 hours PRN Pruritus  magnesium hydroxide Suspension 30 milliLiter(s) Oral two times a day PRN Constipation  oxyCODONE    IR 5 milliGRAM(s) Oral every 3 hours PRN Moderate to Severe Pain (4-10)  simethicone 80 milliGRAM(s) Chew every 4 hours PRN Gas      Labs:  Blood type: O Positive  Rubella IgG: RPR: Negative                          10.5<L>   8.08 >-----------< 151    (  @ 06:30 )             32.2<L>                        11.7   6.04 >-----------< 182    (  @ 14:24 )             34.5    22 @ 14:24      135  |  105  |  17  ----------------------------<  97  4.4   |  18<L>  |  0.62        Ca    9.4      2022 14:24    TPro  5.9<L>  /  Alb  3.5  /  TBili  0.3  /  DBili  x   /  AST  20  /  ALT  13  /  AlkPhos  117  22 @ 14:24          PE:  General: NAD  Lungs: CTA b/l good airway entry  CVS: RRR S1S2  Abdomen: Soft, appropriately tender, incision c/d/i. steri-strips  Extremities: No erythema, traceedema, pedal pulse 2+ bilateral      A/P: 45yo now  POD#2 s/p rLTCS QBL 176ml.  Patient is stable and doing well post-operatively.      - Continue Routine Postop/ PP care  - Continue regular diet.  - Increase ambulation.  - Continue Motrin, Tylenol, oxycodone PRN for pain control.  - Reviewed PEC precaution in detail pt verbalized understanding  - f/u in 6wks for repeat GTT hx of GDMA 1  - Discharge planning today  - F/u in MD office 1-2 wks        VIPUL Pratt-[facility] OB Progress Note: pTLCS, POD#2    S: 47yo now  POD#2 s/p rLTCS QBL 176ml. Pt seen and examine at bedside. No acute events overnight. Pain is well controlled. She is tolerating a regular diet and passing flatus. She is voiding spontaneously, and ambulating without difficulty. Denies CP/SOB. Denies lightheadedness/dizziness. Denies N/V.    O:  Vitals:  Vital Signs Last 24 Hrs  T(C): 36.8 (2022 17:57), Max: 37.1 (2022 06:45)  T(F): 98.2 (2022 17:57), Max: 98.8 (2022 06:45)  HR: 87 (2022 17:57) (76 - 87)  BP: 127/67 (2022 17:57) (105/57 - 127/67)  BP(mean): --  RR: 18 (2022 17:57) (18 - 18)  SpO2: 99% (2022 17:57) (98% - 99%)    MEDICATIONS  (STANDING):  acetaminophen     Tablet .. 975 milliGRAM(s) Oral <User Schedule>  acetaminophen   IVPB .. 1000 milliGRAM(s) IV Intermittent once  diphtheria/tetanus/pertussis (acellular) Vaccine (ADAcel) 0.5 milliLiter(s) IntraMuscular once  ferrous    sulfate 325 milliGRAM(s) Oral daily  heparin   Injectable 5000 Unit(s) SubCutaneous every 12 hours  ibuprofen  Tablet. 600 milliGRAM(s) Oral every 6 hours  influenza   Vaccine 0.5 milliLiter(s) IntraMuscular once  lactated ringers Bolus 1000 milliLiter(s) IV Bolus once  lactated ringers. 1000 milliLiter(s) (125 mL/Hr) IV Continuous <Continuous>  prenatal multivitamin 1 Tablet(s) Oral daily  senna 2 Tablet(s) Oral at bedtime      MEDICATIONS  (PRN):  diphenhydrAMINE 25 milliGRAM(s) Oral every 6 hours PRN Pruritus  magnesium hydroxide Suspension 30 milliLiter(s) Oral two times a day PRN Constipation  oxyCODONE    IR 5 milliGRAM(s) Oral every 3 hours PRN Moderate to Severe Pain (4-10)  simethicone 80 milliGRAM(s) Chew every 4 hours PRN Gas      Labs:  Blood type: O Positive  Rubella IgG: RPR: Negative                          10.5<L>   8.08 >-----------< 151    (  @ 06:30 )             32.2<L>                        11.7   6.04 >-----------< 182    (  @ 14:24 )             34.5    22 @ 14:24      135  |  105  |  17  ----------------------------<  97  4.4   |  18<L>  |  0.62        Ca    9.4      2022 14:24    TPro  5.9<L>  /  Alb  3.5  /  TBili  0.3  /  DBili  x   /  AST  20  /  ALT  13  /  AlkPhos  117  22 @ 14:24          PE:  General: NAD  Lungs: CTA b/l good airway entry  CVS: RRR S1S2  Abdomen: Soft, appropriately tender, incision c/d/i. steri-strips  Extremities: No erythema, traceedema, pedal pulse 2+ bilateral      A/P: 47yo now  POD#2 s/p rLTCS QBL 176ml.  Patient is stable and doing well post-operatively.      - Continue Routine Postop/ PP care  - Continue regular diet.  - Increase ambulation.  - Continue Motrin, Tylenol, oxycodone PRN for pain control.  - Reviewed PEC precaution in detail pt verbalized understanding  - f/u in 6wks for repeat GTT hx of GDMA 1  - Discharge planning today  - F/u in MD office 1-2 wks        VIPUL Pratt-BC    Attending Addendum  Agree with the above note  Patient seen and examined at bedside  Plan for DC today

## 2022-06-27 PROBLEM — Z78.9 OTHER SPECIFIED HEALTH STATUS: Chronic | Status: ACTIVE | Noted: 2022-06-24

## 2022-06-27 LAB
BASOPHILS # BLD AUTO: 0.02 K/UL — SIGNIFICANT CHANGE UP (ref 0–0.2)
BASOPHILS NFR BLD AUTO: 0.4 % — SIGNIFICANT CHANGE UP (ref 0–2)
EOSINOPHIL # BLD AUTO: 0.04 K/UL — SIGNIFICANT CHANGE UP (ref 0–0.5)
EOSINOPHIL NFR BLD AUTO: 0.8 % — SIGNIFICANT CHANGE UP (ref 0–6)
HCT VFR BLD CALC: 26.9 % — LOW (ref 34.5–45)
HGB BLD-MCNC: 8.9 G/DL — LOW (ref 11.5–15.5)
IANC: 4.13 K/UL — SIGNIFICANT CHANGE UP (ref 1.8–7.4)
IMM GRANULOCYTES NFR BLD AUTO: 0.8 % — SIGNIFICANT CHANGE UP (ref 0–1.5)
LYMPHOCYTES # BLD AUTO: 0.67 K/UL — LOW (ref 1–3.3)
LYMPHOCYTES # BLD AUTO: 12.9 % — LOW (ref 13–44)
MCHC RBC-ENTMCNC: 29.9 PG — SIGNIFICANT CHANGE UP (ref 27–34)
MCHC RBC-ENTMCNC: 33.1 GM/DL — SIGNIFICANT CHANGE UP (ref 32–36)
MCV RBC AUTO: 90.3 FL — SIGNIFICANT CHANGE UP (ref 80–100)
MONOCYTES # BLD AUTO: 0.31 K/UL — SIGNIFICANT CHANGE UP (ref 0–0.9)
MONOCYTES NFR BLD AUTO: 6 % — SIGNIFICANT CHANGE UP (ref 2–14)
NEUTROPHILS # BLD AUTO: 4.13 K/UL — SIGNIFICANT CHANGE UP (ref 1.8–7.4)
NEUTROPHILS NFR BLD AUTO: 79.1 % — HIGH (ref 43–77)
NRBC # BLD: 0 /100 WBCS — SIGNIFICANT CHANGE UP
NRBC # FLD: 0 K/UL — SIGNIFICANT CHANGE UP
PLATELET # BLD AUTO: 174 K/UL — SIGNIFICANT CHANGE UP (ref 150–400)
RBC # BLD: 2.98 M/UL — LOW (ref 3.8–5.2)
RBC # FLD: 15 % — HIGH (ref 10.3–14.5)
WBC # BLD: 5.21 K/UL — SIGNIFICANT CHANGE UP (ref 3.8–10.5)
WBC # FLD AUTO: 5.21 K/UL — SIGNIFICANT CHANGE UP (ref 3.8–10.5)

## 2022-06-27 RX ORDER — ERTAPENEM SODIUM 1 G/1
1000 INJECTION, POWDER, LYOPHILIZED, FOR SOLUTION INTRAMUSCULAR; INTRAVENOUS EVERY 24 HOURS
Refills: 0 | Status: DISCONTINUED | OUTPATIENT
Start: 2022-06-27 | End: 2022-06-28

## 2022-06-27 RX ORDER — ERTAPENEM SODIUM 1 G/1
1000 INJECTION, POWDER, LYOPHILIZED, FOR SOLUTION INTRAMUSCULAR; INTRAVENOUS EVERY 24 HOURS
Refills: 0 | Status: DISCONTINUED | OUTPATIENT
Start: 2022-06-27 | End: 2022-06-27

## 2022-06-27 RX ADMIN — Medication 600 MILLIGRAM(S): at 21:40

## 2022-06-27 RX ADMIN — HEPARIN SODIUM 5000 UNIT(S): 5000 INJECTION INTRAVENOUS; SUBCUTANEOUS at 17:40

## 2022-06-27 RX ADMIN — Medication 975 MILLIGRAM(S): at 12:05

## 2022-06-27 RX ADMIN — Medication 975 MILLIGRAM(S): at 18:15

## 2022-06-27 RX ADMIN — Medication 600 MILLIGRAM(S): at 09:50

## 2022-06-27 RX ADMIN — Medication 600 MILLIGRAM(S): at 21:03

## 2022-06-27 RX ADMIN — Medication 10 MILLIGRAM(S): at 10:00

## 2022-06-27 RX ADMIN — ERTAPENEM SODIUM 120 MILLIGRAM(S): 1 INJECTION, POWDER, LYOPHILIZED, FOR SOLUTION INTRAMUSCULAR; INTRAVENOUS at 14:47

## 2022-06-27 RX ADMIN — HEPARIN SODIUM 5000 UNIT(S): 5000 INJECTION INTRAVENOUS; SUBCUTANEOUS at 06:42

## 2022-06-27 RX ADMIN — Medication 600 MILLIGRAM(S): at 10:30

## 2022-06-27 RX ADMIN — Medication 975 MILLIGRAM(S): at 17:39

## 2022-06-27 RX ADMIN — Medication 975 MILLIGRAM(S): at 12:35

## 2022-06-27 NOTE — PROVIDER CONTACT NOTE (OTHER) - ASSESSMENT
patietn passing gas, denies pain, denies blurry vision. oral temp 100.4, rectal temp 100.4.
patient states that she felt her heart race for a brief second but felt fine now. denies any lightheadedness, dizziness, or blurry vision

## 2022-06-27 NOTE — PROGRESS NOTE ADULT - SUBJECTIVE AND OBJECTIVE BOX
SUBJECTIVE:  Pain: well controlled  Complaints: none  MILESTONES:  Alert and oriented x 3  [ x ]  Out of bed/ ambulating. [  x]  Flatus: [ x ]  Postive [  ] Negative   Bowel movement  [  ] Positive [ x ] Negative   Voiding [ x ] Due to void [  ]   Diet: Regular [x  ]  Clears [  ]  NPO [  ]  Infant feeding:  Breast [x  ]   Bottle [  ]  Both [  ]  Feeding related inssues and/or concerns: none    OBJECTIVE:  T(C): 36.7 (22 @ 05:05), Max: 36.8 (22 @ 22:39)  HR: 74 (22 @ 05:05) (74 - 93)  BP: 121/64 (22 @ 05:05) (116/60 - 126/71)  RR: 17 (22 @ 05:05) (17 - 18)  SpO2: 97% (22 @ 05:05) (97% - 99%)  Wt(kg): --      MEDICATIONS  (STANDING):  acetaminophen     Tablet .. 975 milliGRAM(s) Oral <User Schedule>  acetaminophen   IVPB .. 1000 milliGRAM(s) IV Intermittent once  bisacodyl Suppository 10 milliGRAM(s) Rectal once  diphtheria/tetanus/pertussis (acellular) Vaccine (ADAcel) 0.5 milliLiter(s) IntraMuscular once  ferrous    sulfate 325 milliGRAM(s) Oral daily  heparin   Injectable 5000 Unit(s) SubCutaneous every 12 hours  ibuprofen  Tablet. 600 milliGRAM(s) Oral every 6 hours  influenza   Vaccine 0.5 milliLiter(s) IntraMuscular once  lactated ringers Bolus 1000 milliLiter(s) IV Bolus once  lactated ringers. 1000 milliLiter(s) (125 mL/Hr) IV Continuous <Continuous>  prenatal multivitamin 1 Tablet(s) Oral daily  senna 2 Tablet(s) Oral at bedtime    MEDICATIONS  (PRN):  diphenhydrAMINE 25 milliGRAM(s) Oral every 6 hours PRN Pruritus  magnesium hydroxide Suspension 30 milliLiter(s) Oral two times a day PRN Constipation  oxyCODONE    IR 5 milliGRAM(s) Oral every 3 hours PRN Moderate to Severe Pain (4-10)  simethicone 80 milliGRAM(s) Chew every 4 hours PRN Gas      ASSESSMENT:  46y  y/o G 8  P 3   PO Day#  3 labile BPs, labs normal, samir HA,Blurred vision, epigastric pain or any other discomforts, Patient has BP monitor at home      Delivery: Primary [  ]    Repeat [ x ]                                       Indication of procedure:  Condition: Stable  Past Medical History significant for: HPI:  46 y.o.  at 36.4w presenting with complaints of painful contractions 8/10 pain.  Patient reports positive fetal movement, denies LOF, denies vaginal bleeding, denies contractions.  a/p course complications GDMA 1  New onset elevated BP's   scheduled for tertiary c/s on 2022   pmh: denies  psh: denies   obhx: SAB x2  incomplete w/ d&c x2  TOP x1  primary c/s  for arrest of dilation at 6cm 7lbs 4oz  repeat c /s  9lbs 7oz  gynhx: denies   Meds: PNV, Baby ASA    (2022 14:52)    Current Issues: none  Heart:      RRR                        Lungs: clear  Breasts:  Soft [ x ]   Engorged [  ]  Abdomen: Soft [ x ] , distended [  ] nontender [x  ]   Bowel sounds :  Present [ x ]  Absent [  ]   Fundus firm [x  ]  Boggy [  ]  Abdominal incision: Clean, dry and intact [ x ]  Staples [  ] Steri Strips [ x ] Dermabond [  ] Sutures [  ] BRANDY ( )  Patient wearing abdominal binder for support.  Vaginal: Lochia:  Heavy [  ]  Moderate [  ]   Scant [x  ]  Extremities: Edema [ 2+ ] negative Angela's Sign [ x ] Nontender Carrington  [ x ] Positive pedal pulses [ x ]  Other relevant physical exam findings: none      PLAN:  Plan: Increase ambulation, analgesia PRN and pain medication protocol standing oxycodone, ibuprofen and acetaminophen.  Diet: Regular diet  BP check 3 times a day at home if BP > 140/90 inform the provider  2hr GTT 6wks PP  Continue routine post-operative and postpartum care.   Discharge Planning [  ]  Consults:   Social Work [  ] Lacation [  ] Other [  ]

## 2022-06-27 NOTE — PROVIDER CONTACT NOTE (OTHER) - ACTION/TREATMENT ORDERED:
no new orders. remove uriarte as planned and encourage hydration.
tylenol administered. NP Elsey to reach out to attending.

## 2022-06-28 VITALS
RESPIRATION RATE: 17 BRPM | OXYGEN SATURATION: 100 % | HEART RATE: 88 BPM | DIASTOLIC BLOOD PRESSURE: 73 MMHG | TEMPERATURE: 98 F | SYSTOLIC BLOOD PRESSURE: 135 MMHG

## 2022-06-28 RX ADMIN — Medication 975 MILLIGRAM(S): at 05:51

## 2022-06-28 RX ADMIN — Medication 975 MILLIGRAM(S): at 13:00

## 2022-06-28 RX ADMIN — Medication 600 MILLIGRAM(S): at 09:00

## 2022-06-28 RX ADMIN — Medication 975 MILLIGRAM(S): at 12:01

## 2022-06-28 RX ADMIN — Medication 600 MILLIGRAM(S): at 08:14

## 2022-06-28 RX ADMIN — Medication 975 MILLIGRAM(S): at 06:20

## 2022-06-28 NOTE — PROGRESS NOTE ADULT - ATTENDING COMMENTS
Pt seen and examined and agree with above  s/p invanz for POD #3 fever with symptom of chills and breast engorgement, no evidence of leukocytosis  Afebrile x 24 hours  plan for discharge today

## 2022-06-28 NOTE — PROGRESS NOTE ADULT - SUBJECTIVE AND OBJECTIVE BOX
R3 Progress Note     Patient seen and examined at bedside, no acute overnight events. No acute complaints, pain well controlled. Patient is ambulating and tolerating regular diet. Passing flatus, voiding spontaneously. Denies CP, SOB, N/V, HA, blurred vision, epigastric pain.    Vital Signs Last 24 Hours  T(C): 36.8 (06-28-22 @ 06:16), Max: 38 (06-27-22 @ 12:10)  HR: 72 (06-28-22 @ 06:16) (72 - 87)  BP: 134/79 (06-28-22 @ 06:16) (123/60 - 137/77)  RR: 18 (06-28-22 @ 06:16) (17 - 18)  SpO2: 97% (06-28-22 @ 06:16) (97% - 100%)    I&O's Summary      Physical Exam:  General: NAD  Abdomen: Soft, non-tender, non-distended, fundus firm  Incision: Pfannenstiel incision CDI, subcuticular suture closure  Pelvic: Lochia wnl    Labs:    Blood Type: ********* ********* This test has been invalidated, incorrectly reported as OPOS.  Corrected/Revised report is not available.  Antibody Screen: ********* This test has been invalidated, incorrectly reported as NEG.  Corrected/Revised report is not available.  RPR: Negative               8.9    5.21  )-----------( 174      ( 06-27 @ 12:20 )             26.9                10.5   8.08  )-----------( 151      ( 06-25 @ 06:30 )             32.2                11.7   6.04  )-----------( 182      ( 06-24 @ 14:24 )             34.5         MEDICATIONS  (STANDING):  acetaminophen     Tablet .. 975 milliGRAM(s) Oral <User Schedule>  acetaminophen   IVPB .. 1000 milliGRAM(s) IV Intermittent once  diphtheria/tetanus/pertussis (acellular) Vaccine (ADAcel) 0.5 milliLiter(s) IntraMuscular once  ertapenem  IVPB 1000 milliGRAM(s) IV Intermittent every 24 hours  ferrous    sulfate 325 milliGRAM(s) Oral daily  heparin   Injectable 5000 Unit(s) SubCutaneous every 12 hours  ibuprofen  Tablet. 600 milliGRAM(s) Oral every 6 hours  influenza   Vaccine 0.5 milliLiter(s) IntraMuscular once  lactated ringers Bolus 1000 milliLiter(s) IV Bolus once  lactated ringers. 1000 milliLiter(s) (125 mL/Hr) IV Continuous <Continuous>  prenatal multivitamin 1 Tablet(s) Oral daily  senna 2 Tablet(s) Oral at bedtime    MEDICATIONS  (PRN):  diphenhydrAMINE 25 milliGRAM(s) Oral every 6 hours PRN Pruritus  magnesium hydroxide Suspension 30 milliLiter(s) Oral two times a day PRN Constipation  oxyCODONE    IR 5 milliGRAM(s) Oral every 3 hours PRN Moderate to Severe Pain (4-10)  simethicone 80 milliGRAM(s) Chew every 4 hours PRN Gas

## 2022-06-28 NOTE — PROGRESS NOTE ADULT - ASSESSMENT
45y/o  POD#4 from rLTS,  Postpartum course c/b fever w/ Tmax 38(@12) s/p Invanz. H/H 8.9/26.9. No current issues     #postpartum state   - Continue with po analgesia  - Increase ambulation  - Continue regular diet  - Monitor Temp- afebrile overnight    Sarah Patterson   PGY-3
A/P: 45yo POD#1 s/p LTCS.  Patient is stable and doing well post-operatively.    - Continue regular diet  - Increase ambulation  - Continue motrin, tylenol, oxycodone PRN for pain control.    - f/u AM CBC    Mona Arnold, PGY-2

## 2022-06-29 ENCOUNTER — APPOINTMENT (OUTPATIENT)
Dept: OBGYN | Facility: CLINIC | Age: 47
End: 2022-06-29

## 2022-06-29 LAB — B-HEM STREP SPEC QL CULT: NORMAL

## 2022-07-06 ENCOUNTER — APPOINTMENT (OUTPATIENT)
Dept: ANTEPARTUM | Facility: CLINIC | Age: 47
End: 2022-07-06

## 2022-07-06 ENCOUNTER — APPOINTMENT (OUTPATIENT)
Dept: OBGYN | Facility: CLINIC | Age: 47
End: 2022-07-06

## 2022-07-08 ENCOUNTER — APPOINTMENT (OUTPATIENT)
Dept: OBGYN | Facility: CLINIC | Age: 47
End: 2022-07-08

## 2022-07-11 ENCOUNTER — APPOINTMENT (OUTPATIENT)
Dept: OBGYN | Facility: CLINIC | Age: 47
End: 2022-07-11

## 2022-07-11 VITALS — DIASTOLIC BLOOD PRESSURE: 77 MMHG | WEIGHT: 135 LBS | BODY MASS INDEX: 23.91 KG/M2 | SYSTOLIC BLOOD PRESSURE: 128 MMHG

## 2022-07-11 PROCEDURE — 0503F POSTPARTUM CARE VISIT: CPT

## 2022-07-11 NOTE — HISTORY OF PRESENT ILLNESS
[Delivery Date: ___] : on [unfilled] [Repeat C/S] : delivered by  section (repeat) [Male] : Delivery History: baby boy [Wt. ___] : weighing [unfilled] [Breastfeeding] : currently nursing [BF with Difficulty] : nursing without difficulty [Clean/Dry/Intact] : clean, dry and intact [Erythema] : not erythematous [Swelling] : not swollen [Dehiscence] : not dehisced [Healed] : not healed [None] : no vaginal bleeding [Normal] : the vagina was normal [Doing Well] : is doing well [No Sign of Infection] : is showing no signs of infection [Excellent Pain Control] : has excellent pain control [FreeTextEntry8] : 2 week incision check [FreeTextEntry9] : GDM [de-identified] : rltc  [de-identified] : breast and bottle [FreeTextEntry1] : 47 y/o F presenting for incision check\par -Incision healing well\par -f/u for 6 week PP visit\par

## 2022-08-08 ENCOUNTER — APPOINTMENT (OUTPATIENT)
Dept: OBGYN | Facility: CLINIC | Age: 47
End: 2022-08-08

## 2022-08-08 VITALS
DIASTOLIC BLOOD PRESSURE: 74 MMHG | BODY MASS INDEX: 22.32 KG/M2 | HEIGHT: 63 IN | SYSTOLIC BLOOD PRESSURE: 109 MMHG | WEIGHT: 126 LBS

## 2022-08-08 PROCEDURE — 0503F POSTPARTUM CARE VISIT: CPT

## 2022-08-08 NOTE — HISTORY OF PRESENT ILLNESS
[Complications:___] : complications include: [unfilled] [Delivery Date: ___] : on [unfilled] [Repeat C/S] : delivered by  section (repeat) [Male] : Delivery History: baby boy [Wt. ___] : weighing [unfilled] [Doing Well] : is doing well [No Sign of Infection] : is showing no signs of infection [Breastfeeding] : not currently nursing [BF with Difficulty] : nursing without difficulty [Clean/Dry/Intact] : clean, dry and intact [Back to Normal] : is back to normal in size [None] : no vaginal bleeding [Normal] : the vagina was normal [Examination Of The Breasts] : breasts are normal [FreeTextEntry8] : postpartum visit [FreeTextEntry9] : GDM [de-identified] : GDM, delivery at 36.4 weeks [de-identified] : bottle [FreeTextEntry1] : 45 y/o F s/p C/S presenting for postpartum visit \par -incision healing well\par -Patient cleared to resume normal activity (sexual intercourse and exercise)\par -Patient counseled on contraception options, desires condoms\par -patient c/o post partum depression. She has been following with her therapist and pcp. Patient denies any feelings of wanting to hurt herself or her baby.\par -f/u 3 months for annual \par

## 2022-08-21 LAB
ESTIMATED AVERAGE GLUCOSE: 108 MG/DL
HBA1C MFR BLD HPLC: 5.4 %

## 2022-08-23 ENCOUNTER — LABORATORY RESULT (OUTPATIENT)
Age: 47
End: 2022-08-23

## 2022-08-25 ENCOUNTER — NON-APPOINTMENT (OUTPATIENT)
Age: 47
End: 2022-08-25

## 2025-02-13 NOTE — OB RN PATIENT PROFILE - FUNCTIONAL ASSESSMENT - DAILY ACTIVITY ASSESSMENT TYPE
When Outside In The Sun, Do You...: always burns, never tans Additional History: Pt here for consult for Botox. Admission

## 2025-06-27 NOTE — OB RN TRIAGE NOTE - NS_FETALHEARTRATE_OBGYN_ALL_OB_FT
Lab Results   Component Value Date    HGBA1C 6.6 (H) 06/18/2025   Elevated blood glucose is risk factor for wounds and infection.   -recommend tight glycemic control  -blood glucose management per primary service   150